# Patient Record
Sex: MALE | Race: WHITE | NOT HISPANIC OR LATINO | Employment: UNEMPLOYED | URBAN - METROPOLITAN AREA
[De-identification: names, ages, dates, MRNs, and addresses within clinical notes are randomized per-mention and may not be internally consistent; named-entity substitution may affect disease eponyms.]

---

## 2023-10-02 ENCOUNTER — HOSPITAL ENCOUNTER (EMERGENCY)
Facility: CLINIC | Age: 28
Discharge: HOME | End: 2023-10-02
Attending: STUDENT IN AN ORGANIZED HEALTH CARE EDUCATION/TRAINING PROGRAM | Admitting: STUDENT IN AN ORGANIZED HEALTH CARE EDUCATION/TRAINING PROGRAM
Payer: COMMERCIAL

## 2023-10-02 ENCOUNTER — APPOINTMENT (OUTPATIENT)
Dept: GENERAL RADIOLOGY | Facility: CLINIC | Age: 28
End: 2023-10-02
Attending: STUDENT IN AN ORGANIZED HEALTH CARE EDUCATION/TRAINING PROGRAM
Payer: COMMERCIAL

## 2023-10-02 VITALS
SYSTOLIC BLOOD PRESSURE: 139 MMHG | RESPIRATION RATE: 18 BRPM | OXYGEN SATURATION: 98 % | HEIGHT: 68 IN | HEART RATE: 85 BPM | WEIGHT: 155 LBS | DIASTOLIC BLOOD PRESSURE: 73 MMHG | TEMPERATURE: 98.4 F | BODY MASS INDEX: 23.49 KG/M2

## 2023-10-02 DIAGNOSIS — S51.811A LACERATION OF SKIN OF RIGHT FOREARM, INITIAL ENCOUNTER: ICD-10-CM

## 2023-10-02 PROCEDURE — 73090 X-RAY EXAM OF FOREARM: CPT | Mod: RT

## 2023-10-02 PROCEDURE — 90715 TDAP VACCINE 7 YRS/> IM: CPT | Performed by: STUDENT IN AN ORGANIZED HEALTH CARE EDUCATION/TRAINING PROGRAM

## 2023-10-02 PROCEDURE — 99283 EMERGENCY DEPT VISIT LOW MDM: CPT | Mod: 25

## 2023-10-02 PROCEDURE — 250N000011 HC RX IP 250 OP 636: Performed by: STUDENT IN AN ORGANIZED HEALTH CARE EDUCATION/TRAINING PROGRAM

## 2023-10-02 PROCEDURE — 250N000009 HC RX 250: Performed by: STUDENT IN AN ORGANIZED HEALTH CARE EDUCATION/TRAINING PROGRAM

## 2023-10-02 PROCEDURE — 12005 RPR S/N/A/GEN/TRK12.6-20.0CM: CPT

## 2023-10-02 PROCEDURE — 90471 IMMUNIZATION ADMIN: CPT | Performed by: STUDENT IN AN ORGANIZED HEALTH CARE EDUCATION/TRAINING PROGRAM

## 2023-10-02 RX ORDER — CEPHALEXIN 500 MG/1
500 CAPSULE ORAL 4 TIMES DAILY
Qty: 12 CAPSULE | Refills: 0 | Status: SHIPPED | OUTPATIENT
Start: 2023-10-02 | End: 2023-10-05

## 2023-10-02 RX ADMIN — Medication 3 ML: at 13:12

## 2023-10-02 RX ADMIN — CLOSTRIDIUM TETANI TOXOID ANTIGEN (FORMALDEHYDE INACTIVATED), CORYNEBACTERIUM DIPHTHERIAE TOXOID ANTIGEN (FORMALDEHYDE INACTIVATED), BORDETELLA PERTUSSIS TOXOID ANTIGEN (GLUTARALDEHYDE INACTIVATED), BORDETELLA PERTUSSIS FILAMENTOUS HEMAGGLUTININ ANTIGEN (FORMALDEHYDE INACTIVATED), BORDETELLA PERTUSSIS PERTACTIN ANTIGEN, AND BORDETELLA PERTUSSIS FIMBRIAE 2/3 ANTIGEN 0.5 ML: 5; 2; 2.5; 5; 3; 5 INJECTION, SUSPENSION INTRAMUSCULAR at 13:12

## 2023-10-02 ASSESSMENT — ACTIVITIES OF DAILY LIVING (ADL)
ADLS_ACUITY_SCORE: 33
ADLS_ACUITY_SCORE: 35

## 2023-10-02 NOTE — ED PROVIDER NOTES
"  History     Chief Complaint:  Laceration       HPI   Stacey Louise is a previously healthy 28 year old male who presents for evaluation of right forearm lacerations he sustained while using a chainsaw.  Patient reports that he was clearing some brush in his yard, and he was pulling a branch out of the way, when the chainsaw slipped and slid down his right forearm, causing multiple lacerations.  He denies other injury.  He denies numbness, tingling, or weakness.  His last tetanus was in 2009.      Independent Historian:   None - Patient Only    Review of External Notes:   None       Medications:    cephALEXin (KEFLEX) 500 MG capsule        Past Medical History:    No past medical history on file.    Past Surgical History:    No past surgical history on file.     Physical Exam   Patient Vitals for the past 24 hrs:   BP Temp Temp src Pulse Resp SpO2 Height Weight   10/02/23 1531 -- -- -- 85 18 98 % -- --   10/02/23 1145 139/73 98.4  F (36.9  C) Oral 92 16 98 % 1.727 m (5' 8\") 70.3 kg (155 lb)        Physical Exam  Vitals: Reviewed, as above.    General: Alert and oriented. Resting on bed.  Skin: Warm and well-perfused. No rashes or erythema.  Multiple linear lacerations to the volar aspect of the right distal forearm.  Please see wound photo, below. No tendon laceration or foreign body visualized.   HEENT:   Head: Normocephalic, atraumatic. Facial features symmetric.   Eyes: Conjunctiva pink, sclera white. EOMs grossly intact.   Ears: Auricles without lesion, erythema, or edema.   Nose: Symmetric with no discharge.  Neck: Full ROM.   Pulmonary: Chest wall expansion symmetric with no increased work of breathing.   Cardiovascular: Extremities are warm and well-perfused.  Musculoskeletal: Moves all extremities spontaneously.  Right upper extremity compartments are soft and compressible.  Neuro: 5/5 strength with  and flexion/extension of the right wrist.  Sensation intact to right hand and fingertips.  Psych: " Affect appropriate.  Answers questions appropriately. Patient appears calm.         Emergency Department Course       Imaging:  Radius/Ulna XR, PA & LAT, right   Final Result   IMPRESSION: Radius and ulna are negative. No fracture. Laceration   distal forearm with artifact from overlapping bandaging. No metallic   foreign body.      DODIE ROBLES MD            SYSTEM ID:  [id number]         Report per radiology    Laboratory:  Labs Ordered and Resulted from Time of ED Arrival to Time of ED Departure - No data to display     Procedures     Laceration Repair      Procedure: Laceration Repair    Indication: Laceration    Consent: Verbal    Location: Right Forearm     Length: 3 cm    Preparation: Irrigation with Sterile Saline, Pressure device utilized, and wound cleanser .    Anesthesia/Sedation: Topical -LET      Treatment/Exploration: Wound explored and minimal debris removed     Closure: The wound was closed with one layer. Skin/superficial layer was closed with 6 x 4-0 Nylon using Interrupted sutures.     Patient Status: The patient tolerated the procedure well: Yes. There were no complications.    Laceration Repair      Procedure: Laceration Repair    Indication: Laceration    Consent: Verbal    Location: Right Forearm     Length: 3 cm    Preparation: Irrigation with Sterile Saline, Pressure device utilized, and wound cleanser.    Anesthesia/Sedation: Topical -LET      Treatment/Exploration: Wound explored and minimal debris removed     Closure: The wound was closed with one layer. Skin/superficial layer was closed with 6 x 4-0 Nylon using Interrupted sutures.     Patient Status: The patient tolerated the procedure well: Yes. There were no complications.    Laceration Repair      Procedure: Laceration Repair    Indication: Laceration    Consent: Verbal    Location: Right Forearm     Length: 2 cm    Preparation: Irrigation with Sterile Saline, Pressure device utilized, and wound cleanser.    Anesthesia/Sedation:  Topical -LET      Treatment/Exploration: Wound explored and minimal debris removed     Closure: The wound was closed with one layer. Skin/superficial layer was closed with 4 x 4-0 Nylon using Interrupted sutures.     Patient Status: The patient tolerated the procedure well: Yes. There were no complications.    Laceration Repair      Procedure: Laceration Repair    Indication: Laceration    Consent: Verbal    Location: Right Forearm     Length: 2 cm    Preparation: Irrigation with Sterile Saline, Pressure device utilized, and wound cleanser.    Anesthesia/Sedation: Topical -LET      Treatment/Exploration: Wound explored and minimal debris removed     Closure: The wound was closed with one layer. Skin/superficial layer was closed with 4 x 4-0 Nylon using Interrupted sutures.     Patient Status: The patient tolerated the procedure well: Yes. There were no complications.    Laceration Repair      Procedure: Laceration Repair    Indication: Laceration    Consent: Verbal    Location: Right Forearm     Length: 2 cm    Preparation: Irrigation with Sterile Saline, Pressure device utilized, and wound cleanser.    Anesthesia/Sedation: Topical -LET      Treatment/Exploration: Wound explored and minimal debris removed     Closure: The wound was closed with one layer. Skin/superficial layer was closed with 3 x 4-0 Nylon using Interrupted sutures.     Patient Status: The patient tolerated the procedure well: Yes. There were no complications.    Laceration Repair      Procedure: Laceration Repair    Indication: Laceration    Consent: Verbal    Location: Right Forearm     Length: 2 cm    Preparation: Irrigation with Sterile Saline, Pressure device utilized, and wound cleanser.    Anesthesia/Sedation: Topical -LET      Treatment/Exploration: Wound explored and minimal debris removed     Closure: The wound was closed with one layer. Skin/superficial layer was closed with 1 x 4-0 Nylon using Interrupted sutures and 1 x 4-0 Nylon using  a running suture.     Patient Status: The patient tolerated the procedure well: Yes. There were no complications.    Emergency Department Course & Assessments:             Interventions:  Medications   Tdap (tetanus-diphtheria-acell pertussis) (ADACEL) injection 0.5 mL (0.5 mLs Intramuscular $Given 10/2/23 1312)   lido-EPINEPHrine-tetracaine (LET) topical gel GEL (3 mLs Topical $Given 10/2/23 1312)        Assessments:  I evaluate the patient, as noted above.  I performed laceration repairs, according to the above procedure notes.      Independent Interpretation (X-rays, CTs, rhythm strip):  X-ray with no fracture or foreign body.    Consultations/Discussion of Management or Tests:  None        Social Determinants of Health affecting care:   None    Disposition:  The patient was discharged to home.     Impression & Plan      Medical Decision Making:  Stacey Louise is a previously healthy 28 year old male who presents for evaluation of right forearm lacerations he sustained while using a chainsaw.  Please see HPI and exam for details.  Differential includes tendon laceration, neurovascular injury, foreign body, lacerations, compartment syndrome, among others.  Patient has a reassuring physical exam and is neurovascularly intact.  He is full-strength and sensation with no visualized tendon or nerve injury or foreign body.  X-ray confirms no fracture or foreign body.  The wounds were copiously irrigated by myself using 1.5 L of normal saline and wound cleanser.  See wound photo, starting from the distal aspect of the forearm, patient had 6 lacerations repaired, according to the procedure notes.  The remainder of the wound had only very thin skin fragments, and they were not amenable to suture repair.  I applied a layer of bacitracin and a nonadherent dressing.  I provided 3 days of wound prophylaxis with cephalexin, as below.  Wound care was discussed.  I instructed the patient to have his sutures out in 10 to 14 days  with his primary care provider.  Possible complications including scarring and infection were discussed.  We also discussed possibility of compartment syndrome, and reasons return to the emergency department, including pain out of portion, numbness, tingling, or change in color.  He is comfortable with this plan.  Tetanus is updated today.      Diagnosis:    ICD-10-CM    1. Laceration of skin of right forearm, initial encounter  S51.811A            Discharge Medications:  Discharge Medication List as of 10/2/2023  3:25 PM        START taking these medications    Details   cephALEXin (KEFLEX) 500 MG capsule Take 1 capsule (500 mg) by mouth 4 times daily for 3 days, Disp-12 capsule, R-0, E-Prescribe                  10/2/2023   Nayeli Ceja PA-C Sells, Jenna, PA-C  10/02/23 1834

## 2023-10-02 NOTE — ED TRIAGE NOTES
Patient was doing a home project with a chain saw and the saw jumped back and cut the interior of his right hand, with several laceration on the arm.  Bleeding controlled, first laceration is the deepest no active deep bleeding at this time, wound was re-wrapped.